# Patient Record
Sex: MALE | Race: WHITE | NOT HISPANIC OR LATINO
[De-identification: names, ages, dates, MRNs, and addresses within clinical notes are randomized per-mention and may not be internally consistent; named-entity substitution may affect disease eponyms.]

---

## 2019-12-18 ENCOUNTER — TRANSCRIPTION ENCOUNTER (OUTPATIENT)
Age: 29
End: 2019-12-18

## 2019-12-18 ENCOUNTER — INPATIENT (INPATIENT)
Facility: HOSPITAL | Age: 29
LOS: 0 days | Discharge: HOME | DRG: 343 | End: 2019-12-19
Attending: SURGERY | Admitting: SURGERY
Payer: COMMERCIAL

## 2019-12-18 VITALS
WEIGHT: 199.96 LBS | HEIGHT: 70 IN | HEART RATE: 69 BPM | TEMPERATURE: 98 F | RESPIRATION RATE: 20 BRPM | DIASTOLIC BLOOD PRESSURE: 84 MMHG | OXYGEN SATURATION: 99 % | SYSTOLIC BLOOD PRESSURE: 131 MMHG

## 2019-12-18 LAB
ALBUMIN SERPL ELPH-MCNC: 4.6 G/DL — SIGNIFICANT CHANGE UP (ref 3.3–5)
ALP SERPL-CCNC: 65 U/L — SIGNIFICANT CHANGE UP (ref 40–120)
ALT FLD-CCNC: 43 U/L — SIGNIFICANT CHANGE UP (ref 10–45)
ANION GAP SERPL CALC-SCNC: 12 MMOL/L — SIGNIFICANT CHANGE UP (ref 5–17)
APTT BLD: 31.1 SEC — SIGNIFICANT CHANGE UP (ref 27.5–36.3)
AST SERPL-CCNC: 23 U/L — SIGNIFICANT CHANGE UP (ref 10–40)
BASOPHILS # BLD AUTO: 0.03 K/UL — SIGNIFICANT CHANGE UP (ref 0–0.2)
BASOPHILS NFR BLD AUTO: 0.3 % — SIGNIFICANT CHANGE UP (ref 0–2)
BILIRUB SERPL-MCNC: 3 MG/DL — HIGH (ref 0.2–1.2)
BLD GP AB SCN SERPL QL: NEGATIVE — SIGNIFICANT CHANGE UP
BUN SERPL-MCNC: 14 MG/DL — SIGNIFICANT CHANGE UP (ref 7–23)
CALCIUM SERPL-MCNC: 9.9 MG/DL — SIGNIFICANT CHANGE UP (ref 8.4–10.5)
CHLORIDE SERPL-SCNC: 99 MMOL/L — SIGNIFICANT CHANGE UP (ref 96–108)
CO2 SERPL-SCNC: 28 MMOL/L — SIGNIFICANT CHANGE UP (ref 22–31)
CREAT SERPL-MCNC: 1.02 MG/DL — SIGNIFICANT CHANGE UP (ref 0.5–1.3)
EOSINOPHIL # BLD AUTO: 0.13 K/UL — SIGNIFICANT CHANGE UP (ref 0–0.5)
EOSINOPHIL NFR BLD AUTO: 1.4 % — SIGNIFICANT CHANGE UP (ref 0–6)
GLUCOSE SERPL-MCNC: 94 MG/DL — SIGNIFICANT CHANGE UP (ref 70–99)
HCT VFR BLD CALC: 44.5 % — SIGNIFICANT CHANGE UP (ref 39–50)
HGB BLD-MCNC: 15 G/DL — SIGNIFICANT CHANGE UP (ref 13–17)
IMM GRANULOCYTES NFR BLD AUTO: 0.3 % — SIGNIFICANT CHANGE UP (ref 0–1.5)
LYMPHOCYTES # BLD AUTO: 1.5 K/UL — SIGNIFICANT CHANGE UP (ref 1–3.3)
LYMPHOCYTES # BLD AUTO: 16 % — SIGNIFICANT CHANGE UP (ref 13–44)
MCHC RBC-ENTMCNC: 29 PG — SIGNIFICANT CHANGE UP (ref 27–34)
MCHC RBC-ENTMCNC: 33.7 GM/DL — SIGNIFICANT CHANGE UP (ref 32–36)
MCV RBC AUTO: 86.1 FL — SIGNIFICANT CHANGE UP (ref 80–100)
MONOCYTES # BLD AUTO: 0.56 K/UL — SIGNIFICANT CHANGE UP (ref 0–0.9)
MONOCYTES NFR BLD AUTO: 6 % — SIGNIFICANT CHANGE UP (ref 2–14)
NEUTROPHILS # BLD AUTO: 7.14 K/UL — SIGNIFICANT CHANGE UP (ref 1.8–7.4)
NEUTROPHILS NFR BLD AUTO: 76 % — SIGNIFICANT CHANGE UP (ref 43–77)
NRBC # BLD: 0 /100 WBCS — SIGNIFICANT CHANGE UP (ref 0–0)
PLATELET # BLD AUTO: 204 K/UL — SIGNIFICANT CHANGE UP (ref 150–400)
POTASSIUM SERPL-MCNC: 4.2 MMOL/L — SIGNIFICANT CHANGE UP (ref 3.5–5.3)
POTASSIUM SERPL-SCNC: 4.2 MMOL/L — SIGNIFICANT CHANGE UP (ref 3.5–5.3)
PROT SERPL-MCNC: 7.6 G/DL — SIGNIFICANT CHANGE UP (ref 6–8.3)
RBC # BLD: 5.17 M/UL — SIGNIFICANT CHANGE UP (ref 4.2–5.8)
RBC # FLD: 11.9 % — SIGNIFICANT CHANGE UP (ref 10.3–14.5)
RH IG SCN BLD-IMP: POSITIVE — SIGNIFICANT CHANGE UP
RH IG SCN BLD-IMP: POSITIVE — SIGNIFICANT CHANGE UP
SODIUM SERPL-SCNC: 139 MMOL/L — SIGNIFICANT CHANGE UP (ref 135–145)
WBC # BLD: 9.39 K/UL — SIGNIFICANT CHANGE UP (ref 3.8–10.5)
WBC # FLD AUTO: 9.39 K/UL — SIGNIFICANT CHANGE UP (ref 3.8–10.5)

## 2019-12-18 PROCEDURE — 99285 EMERGENCY DEPT VISIT HI MDM: CPT

## 2019-12-18 PROCEDURE — 93010 ELECTROCARDIOGRAM REPORT: CPT | Mod: NC

## 2019-12-18 PROCEDURE — 71046 X-RAY EXAM CHEST 2 VIEWS: CPT | Mod: 26

## 2019-12-18 RX ORDER — KETOROLAC TROMETHAMINE 30 MG/ML
15 SYRINGE (ML) INJECTION ONCE
Refills: 0 | Status: DISCONTINUED | OUTPATIENT
Start: 2019-12-18 | End: 2019-12-18

## 2019-12-18 RX ORDER — SODIUM CHLORIDE 9 MG/ML
1000 INJECTION, SOLUTION INTRAVENOUS ONCE
Refills: 0 | Status: COMPLETED | OUTPATIENT
Start: 2019-12-18 | End: 2019-12-18

## 2019-12-18 RX ORDER — PIPERACILLIN AND TAZOBACTAM 4; .5 G/20ML; G/20ML
3.38 INJECTION, POWDER, LYOPHILIZED, FOR SOLUTION INTRAVENOUS ONCE
Refills: 0 | Status: COMPLETED | OUTPATIENT
Start: 2019-12-18 | End: 2019-12-18

## 2019-12-18 RX ADMIN — SODIUM CHLORIDE 1000 MILLILITER(S): 9 INJECTION, SOLUTION INTRAVENOUS at 23:45

## 2019-12-18 RX ADMIN — SODIUM CHLORIDE 1000 MILLILITER(S): 9 INJECTION, SOLUTION INTRAVENOUS at 22:52

## 2019-12-18 RX ADMIN — Medication 15 MILLIGRAM(S): at 22:52

## 2019-12-18 NOTE — ED PROVIDER NOTE - CCCP TRG CHIEF CMPLNT
Please apply regular wound VAC to right foot plantar wound today  Discontinue veraflo as it was macerating the wound  Discussed with wound care team sent  for surgery/abdominal pain

## 2019-12-18 NOTE — ED PROVIDER NOTE - PROGRESS NOTE DETAILS
brought disc to charge to get to CT scan for upload Hyade Moyer MD consult to sx CT Disc retrieved from MR, brought to ED CT for upload and successfully uploaded. Pt feels ok at this time, no active complaints, surgery to review CT scan

## 2019-12-18 NOTE — ED PROVIDER NOTE - CARE PLAN
Principal Discharge DX:	Appendicitis, acute  Assessment and plan of treatment:	fluids, NPO, pain control, labs, imaging, admit to surgery

## 2019-12-18 NOTE — ED PROVIDER NOTE - OBJECTIVE STATEMENT
Pt is an otherwise healthy 29M presenting with abdominal pain x3 days. Started as generalized abdominal pain, dull in sensation, 3-4 in intensity, constant, worsened with movement, improved with rest, and progressively worsened until day of coming to the ER. Prior to coming to ER, he went to his PMD (Dr Karen Singh), and was sent for CTAP w/o IV contrast, showing mild acute appendicitis without abscess or complications. Today, he states that the pain is more localized to the RLQ, and was recommended by his PMD to come to the ER to treat his acute appendicitis.     He otherwise denies fevers, chills, nausea, vomiting, shortness of breath, cough, chest pain, lower extremity edema, diarrhea, or constipation. He does endorse decreased PO intake in the last 1-2 days. He did not take medicine for his abdominal pain

## 2019-12-18 NOTE — ED PROVIDER NOTE - ATTENDING CONTRIBUTION TO CARE
29 M w/ no pmh presents to the ED w/ concern for appendicitis seen on outpt ct scan. pt denies any chest pain no shortness of breath, +RLQ abdominal pain that is 5/10 in intensity.   I have reviewed the triage vital signs. Const: Well-nourished, Well-developed, Eyes: no conjunctival injection and no scleral icterus ENMT: Moist mucus membranes, CVS: +S1/S2, radial/DP pulse 2+ bilaterally  RESP: Unlabored respiratory effort, Clear to auscultation bilaterally GI: rlq abd pain, Nondistended soft abdomen, no CVA tenderness MSK: Extremities w/o deformity or ttp, Psych: Awake, Alert, & Orientedx3;  Appropriate mood and affect, cooperative  concern for acute appendicitis based on ct scan. will consult sx for further evaluation of symptoms.

## 2019-12-18 NOTE — ED PROVIDER NOTE - PHYSICAL EXAMINATION
T(C): 36.6 (12-18-19 @ 17:09), Max: 36.6 (12-18-19 @ 17:09)  HR: 69 (12-18-19 @ 17:09) (69 - 69)  BP: 131/84 (12-18-19 @ 17:09) (131/84 - 131/84)  RR: 20 (12-18-19 @ 17:09) (20 - 20)  SpO2: 99% (12-18-19 @ 17:09) (99% - 99%)    PHYSICAL EXAM:  General: Male laying in bed comfortably  HEENT: NCAT, no conjunctival pallor, no scleral icterus, dry mucous membranes  Pulmonary: Clear to auscultation bilaterally, no wheezing, rales, or ronchi  Cardiovascular: Regular rate and rhythm, normal S1/S2, no murmurs, rubs, or gallops  Abdominal Exam: Soft, tender to palpation in the RLQ to deep palpation, no rebound or guarding, non-tender in other quadrants, no HSM, no masses.  Extremities: No edema, pulses 2+ bilaterally in the upper and lower extremities, capillary refill < 2 sec.  Neuro: Grossly intact  Skin: Warm, dry, no visible jaundice, no rashes

## 2019-12-18 NOTE — ED PROVIDER NOTE - RAPID ASSESSMENT
**Pt seen in waiting room by  Chio Herr, documentation completed by Calin Garnica. Pt to be sent to main ED for further evaluation - all orders placed to be followed by MD in the main ED**    30 y/o M pt presents to ED for evaluation of appendicitis. Per pt, he was sent in for his appendicitis and has general discomfort in the lower abdomen, that he initially felt was in the middle but now feels in the LLQ. Pt relates that he's had worsening pain x3 days, and his last BM was this morning. Pt denies fever, chills, vomiting, diarrhea, and any other acute complaints. Pt is not on any medications or blood thinners. **Pt seen in waiting room by  Chio Herr, documentation completed by Calin Garnica. Pt to be sent to main ED for further evaluation - all orders placed to be followed by MD in the main ED**    30 y/o M pt presents to ED for evaluation of appendicitis. pain started 3 days ago, had outpatient scan today with positive appy.   states that he initially felt main in the mid abdomen but now feels in the LLQ. Pt relates that he's had worsening pain x3 days, and his last BM was this morning. Pt denies fever, chills, vomiting, diarrhea, and any other acute complaints. Pt is not on any medications or blood thinners.

## 2019-12-19 ENCOUNTER — TRANSCRIPTION ENCOUNTER (OUTPATIENT)
Age: 29
End: 2019-12-19

## 2019-12-19 VITALS — RESPIRATION RATE: 18 BRPM | OXYGEN SATURATION: 96 % | TEMPERATURE: 97 F

## 2019-12-19 DIAGNOSIS — K35.80 UNSPECIFIED ACUTE APPENDICITIS: ICD-10-CM

## 2019-12-19 PROCEDURE — C1889: CPT

## 2019-12-19 PROCEDURE — 96375 TX/PRO/DX INJ NEW DRUG ADDON: CPT

## 2019-12-19 PROCEDURE — 71046 X-RAY EXAM CHEST 2 VIEWS: CPT

## 2019-12-19 PROCEDURE — 86850 RBC ANTIBODY SCREEN: CPT

## 2019-12-19 PROCEDURE — 88304 TISSUE EXAM BY PATHOLOGIST: CPT

## 2019-12-19 PROCEDURE — 99285 EMERGENCY DEPT VISIT HI MDM: CPT | Mod: 25

## 2019-12-19 PROCEDURE — 85610 PROTHROMBIN TIME: CPT

## 2019-12-19 PROCEDURE — 88304 TISSUE EXAM BY PATHOLOGIST: CPT | Mod: 26

## 2019-12-19 PROCEDURE — 86901 BLOOD TYPING SEROLOGIC RH(D): CPT

## 2019-12-19 PROCEDURE — 85730 THROMBOPLASTIN TIME PARTIAL: CPT

## 2019-12-19 PROCEDURE — 93005 ELECTROCARDIOGRAM TRACING: CPT

## 2019-12-19 PROCEDURE — 96374 THER/PROPH/DIAG INJ IV PUSH: CPT

## 2019-12-19 PROCEDURE — 80053 COMPREHEN METABOLIC PANEL: CPT

## 2019-12-19 PROCEDURE — 86900 BLOOD TYPING SEROLOGIC ABO: CPT

## 2019-12-19 PROCEDURE — 85027 COMPLETE CBC AUTOMATED: CPT

## 2019-12-19 PROCEDURE — 96361 HYDRATE IV INFUSION ADD-ON: CPT

## 2019-12-19 RX ORDER — OXYCODONE HYDROCHLORIDE 5 MG/1
1 TABLET ORAL
Qty: 10 | Refills: 0
Start: 2019-12-19 | End: 2019-12-21

## 2019-12-19 RX ORDER — ACETAMINOPHEN 500 MG
1000 TABLET ORAL EVERY 6 HOURS
Refills: 0 | Status: DISCONTINUED | OUTPATIENT
Start: 2019-12-19 | End: 2019-12-19

## 2019-12-19 RX ORDER — SODIUM CHLORIDE 9 MG/ML
1000 INJECTION, SOLUTION INTRAVENOUS
Refills: 0 | Status: DISCONTINUED | OUTPATIENT
Start: 2019-12-19 | End: 2019-12-19

## 2019-12-19 RX ORDER — HYDROMORPHONE HYDROCHLORIDE 2 MG/ML
1 INJECTION INTRAMUSCULAR; INTRAVENOUS; SUBCUTANEOUS
Refills: 0 | Status: DISCONTINUED | OUTPATIENT
Start: 2019-12-19 | End: 2019-12-19

## 2019-12-19 RX ORDER — PIPERACILLIN AND TAZOBACTAM 4; .5 G/20ML; G/20ML
3.38 INJECTION, POWDER, LYOPHILIZED, FOR SOLUTION INTRAVENOUS ONCE
Refills: 0 | Status: DISCONTINUED | OUTPATIENT
Start: 2019-12-19 | End: 2019-12-19

## 2019-12-19 RX ORDER — HYDROMORPHONE HYDROCHLORIDE 2 MG/ML
0.5 INJECTION INTRAMUSCULAR; INTRAVENOUS; SUBCUTANEOUS
Refills: 0 | Status: DISCONTINUED | OUTPATIENT
Start: 2019-12-19 | End: 2019-12-19

## 2019-12-19 RX ORDER — PIPERACILLIN AND TAZOBACTAM 4; .5 G/20ML; G/20ML
3.38 INJECTION, POWDER, LYOPHILIZED, FOR SOLUTION INTRAVENOUS EVERY 8 HOURS
Refills: 0 | Status: DISCONTINUED | OUTPATIENT
Start: 2019-12-19 | End: 2019-12-19

## 2019-12-19 RX ORDER — ACETAMINOPHEN 500 MG
2 TABLET ORAL
Qty: 0 | Refills: 0 | DISCHARGE
Start: 2019-12-19

## 2019-12-19 RX ORDER — OXYCODONE HYDROCHLORIDE 5 MG/1
5 TABLET ORAL EVERY 4 HOURS
Refills: 0 | Status: DISCONTINUED | OUTPATIENT
Start: 2019-12-19 | End: 2019-12-19

## 2019-12-19 RX ORDER — ENOXAPARIN SODIUM 100 MG/ML
40 INJECTION SUBCUTANEOUS DAILY
Refills: 0 | Status: DISCONTINUED | OUTPATIENT
Start: 2019-12-19 | End: 2019-12-19

## 2019-12-19 RX ORDER — ONDANSETRON 8 MG/1
4 TABLET, FILM COATED ORAL ONCE
Refills: 0 | Status: DISCONTINUED | OUTPATIENT
Start: 2019-12-19 | End: 2019-12-19

## 2019-12-19 RX ADMIN — Medication 1000 MILLIGRAM(S): at 12:38

## 2019-12-19 RX ADMIN — PIPERACILLIN AND TAZOBACTAM 200 GRAM(S): 4; .5 INJECTION, POWDER, LYOPHILIZED, FOR SOLUTION INTRAVENOUS at 00:45

## 2019-12-19 RX ADMIN — SODIUM CHLORIDE 100 MILLILITER(S): 9 INJECTION, SOLUTION INTRAVENOUS at 08:35

## 2019-12-19 RX ADMIN — Medication 1000 MILLIGRAM(S): at 12:39

## 2019-12-19 NOTE — H&P ADULT - ASSESSMENT
30yo M with no MHx or SHx presented 12/18 with 3 days of constant, worsening periumbilical pain with development of RLQ pain and decreased appetite. Outpatient CT read by [facility] radiology showed acute uncomplicated appendicitis on prelim read.    Plan  - Admit to Dr. PARMINDER Jean  - NPO with IVF  - Zosyn antibiotics  - Booked and consented for appendectomy  - DVT ppx: Lovenox  - Discussed with Dr. PARMINDER Jean 30yo M with no MHx or SHx presented 12/18 with 3 days of constant, worsening periumbilical pain with development of RLQ pain and decreased appetite. Outpatient CT read by [facility] radiology showed acute uncomplicated appendicitis on prelim read.    Plan  - Admit to Dr. PARMINDER Jean  - NPO with IVF  - Zosyn antibiotics  - Booked and consented for laparoscopic appendectomy  - DVT ppx: Lovenox  - Discussed with Dr. PARMINDER Jean

## 2019-12-19 NOTE — DISCHARGE NOTE PROVIDER - HOSPITAL COURSE
28yo M with no MHx or SHx presented 12/18 with 3 days of constant, worsening periumbilical pain with development of RLQ pain and decreased appetite. Patient stated that he first noticed periumbilical discomfort 3 days prior to presentation and worsening of pain caused him to present to his PCP in the morning of 12/18. His PCP sent him for an outpatient CT scan that reportedly showed acute appendicitis. He was subsequently sent to the ED where he had WBC 9.4, endorsed an episode of chills, and read of outpatient CT by [facility] radiology showed acute uncomplicated appendicitis on prelim read. He received 1L fluid bolus and Zosyn in the ED. Patient denied N/V, fever, CP, SOB, diarrhea, constipation, or dysuria.        Patient was admitted to RED surgery, made NPO/IVF and given IV antibiotics.  The patient was taken to the OR on and underwent a laparoscopic appendectomy.  The patient tolerated the procedure well without complications, was extubated, and transferred to the PACU in stable condition. In the PACU, the patients' pain was controlled, vitals stable, tolerating PO, and voiding appropriately.  The patient was transferred to the surgical floor in stable condition.         On day of discharge, the patient was tolerating diet, ambulating well and pain controlled. Patient will be discharged home with outpatient follow up with Dr. Jean within 1-2 weeks.

## 2019-12-19 NOTE — DISCHARGE NOTE NURSING/CASE MANAGEMENT/SOCIAL WORK - PATIENT PORTAL LINK FT
You can access the FollowMyHealth Patient Portal offered by [facility] by registering at the following website: [email or web address]. By joining [facility]’s FollowMyHealth portal, you will also be able to view your health information using other applications (apps) compatible with our system.

## 2019-12-19 NOTE — H&P ADULT - NSHPPHYSICALEXAM_GEN_ALL_CORE
Physical Exam:  Gen: NAD  LS: nml respiratory effort  Card: pulse regularly present  GI: abd soft, periumbilical and RLQ tender to palpation  Ext: warm

## 2019-12-19 NOTE — DISCHARGE NOTE PROVIDER - NSDCCPCAREPLAN_GEN_ALL_CORE_FT
PRINCIPAL DISCHARGE DIAGNOSIS  Diagnosis: Appendicitis, acute  Assessment and Plan of Treatment: WOUND CARE: You may shower. Pat Dry abdomen. Leave the white steri strips in place, they will fall off on their own in approximately 5-7 days.   BATHING: Please do not submerge wound underwater. You may shower and/or sponge bathe.  ACTIVITY: No heavy lifting anything more than 10-15lbs or straining. Otherwise, you may return to your usual level of physical activity. If you are taking narcotic pain medication (such as Percocet), do NOT drive a car, operate machinery or make important decisions.  DIET: Low fiber diet  NOTIFY YOUR SURGEON IF: You have any bleeding that does not stop, any pus draining from your wound, any fever (over 100.4 F) or chills, persistent nausea/vomiting with inability to tolerate food or liquids, persistent diarrhea, or if your pain is not controlled on your discharge pain medications.  FOLLOW-UP:  1. Please call to make a follow-up appointment within one week of discharge   2. Please follow up with your primary care physician in one week regarding your hospitalization.

## 2019-12-19 NOTE — DISCHARGE NOTE PROVIDER - NSDCMRMEDTOKEN_GEN_ALL_CORE_FT
acetaminophen 500 mg oral tablet: 2 tab(s) orally every 6 hours  oxyCODONE 5 mg oral tablet: 1 tab(s) orally every 6 hours, As Needed -Severe Pain (7 - 10) MDD:4 tabs

## 2019-12-19 NOTE — H&P ADULT - NSHPLABSRESULTS_GEN_ALL_CORE
15.0                  Neurophils% (auto):   76.0   (12-18 @ 18:56):    9.39 )-----------(204          Lymphocytes% (auto):  16.0                                          44.5                   Eosinphils% (auto):   1.4      Manual%: Neutrophils x    ; Lymphocytes x    ; Eosinophils x    ; Bands%: x    ; Blasts x          12-18    139  |  99  |  14  ----------------------------<  94  4.2   |  28  |  1.02    Ca    9.9      18 Dec 2019 18:56    TPro  7.6  /  Alb  4.6  /  TBili  3.0<H>  /  DBili  x   /  AST  23  /  ALT  43  /  AlkPhos  65  12-18    ( 12-18 @ 18:56 )   PT: 13.1 sec;   INR: 1.13 ratio  aPTT: 31.1 sec        RECENT CULTURES:          Imaging:  ******PRELIMINARY REPORT******    ******PRELIMINARY REPORT******          EXAM:  XR CHEST PA LAT 2V                          PROCEDURE DATE:  12/18/2019      ******PRELIMINARY REPORT******    ******PRELIMINARY REPORT******          INTERPRETATION:  no emergent findings    ******PRELIMINARY REPORT******    ******PRELIMINARY REPORT******

## 2019-12-19 NOTE — DISCHARGE NOTE PROVIDER - CARE PROVIDER_API CALL
Juan Diego Jean)  Surgery  [address], [address]  [place] [address]  Phone: [phone]  Fax: [phone]  Follow Up Time: 2 weeks

## 2019-12-19 NOTE — H&P ADULT - HISTORY OF PRESENT ILLNESS
30yo M with no MHx or SHx presented 12/18 with 3 days of constant, worsening periumbilical pain with development of RLQ pain and decreased appetite. Patient stated that he first noticed periumbilical discomfort 3 days prior to presentation and worsening of pain caused him to present to his PCP in the morning of 12/18. His PCP sent him for an outpatient CT scan that reportedly showed acute appendicitis. He was subsequently sent to the ED where he had WBC 9.4, endorsed an episode of chills, and read of outpatient CT by [facility] radiology showed acute uncomplicated appendicitis on prelim read. He received 1L fluid bolus and Zosyn in the ED. Patient denied N/V, fever, CP, SOB, diarrhea, constipation, or dysuria.

## 2019-12-26 LAB — SURGICAL PATHOLOGY STUDY: SIGNIFICANT CHANGE UP

## 2021-08-18 NOTE — ED ADULT NURSE NOTE - BREATHING, MLM
Spontaneous, unlabored and symmetrical Suturegard Body: The suture ends were repeatedly re-tightened and re-clamped to achieve the desired tissue expansion.

## 2022-05-24 DIAGNOSIS — Z00.00 ENCOUNTER FOR GENERAL ADULT MEDICAL EXAMINATION W/OUT ABNORMAL FINDINGS: ICD-10-CM

## 2022-06-20 DIAGNOSIS — Z31.440 ENCOUNTER OF MALE FOR TESTING FOR GENETIC DISEASE CARRIER STATUS FOR PROCREATIVE MANAGEMENT: ICD-10-CM

## 2022-07-18 ENCOUNTER — OFFICE (OUTPATIENT)
Dept: URBAN - METROPOLITAN AREA CLINIC 93 | Facility: CLINIC | Age: 32
Setting detail: OPHTHALMOLOGY
End: 2022-07-18
Payer: COMMERCIAL

## 2022-07-18 VITALS — WEIGHT: 193 LBS | HEIGHT: 70 IN | BODY MASS INDEX: 27.63 KG/M2

## 2022-07-18 DIAGNOSIS — T15.01XA: ICD-10-CM

## 2022-07-18 PROBLEM — T15.01X CORNEAL FOREIGN BODY; RIGHT EYE: Status: ACTIVE | Noted: 2022-07-18

## 2022-07-18 PROCEDURE — 92002 INTRM OPH EXAM NEW PATIENT: CPT | Performed by: OPHTHALMOLOGY

## 2022-07-18 PROCEDURE — 65210 REMOVE FOREIGN BODY FROM EYE: CPT | Performed by: OPHTHALMOLOGY

## 2022-07-18 ASSESSMENT — CORNEAL TRAUMA - FOREIGN BODY: OD_FOREIGNBODY: NON-PENETRATING NON METALLIC PRESENT

## 2022-07-18 ASSESSMENT — VISUAL ACUITY
OD_BCVA: 20/20-1
OS_BCVA: 20/30-2

## 2022-09-28 NOTE — PATIENT PROFILE ADULT - NSPROPTRIGHTSUPPORTNAME_GEN_A_NUR
Diabetic eye exam GAP report-I spoke to pt regarding her overdue Diabetic eye exam and she stated she will call back schedule.       
Bre Vogt

## 2023-01-24 ENCOUNTER — APPOINTMENT (OUTPATIENT)
Dept: HUMAN REPRODUCTION | Facility: CLINIC | Age: 33
End: 2023-01-24

## 2025-01-28 NOTE — PATIENT PROFILE ADULT - NSPROGENPREVTRANSF_GEN_A_NUR
Recent dehydration and YOLI.  Most recent creatinine 2.1, baseline creatinine 1.6  - Recheck BMP  
Recent hypotension and dizziness after recent weight loss.  All blood pressure medications are currently on hold after recent hospitalization.  His blood pressure has been stable over the last several days, averaging 120s-130s/70s-80s.    - No changes to current treatment plan  - Continue to monitor blood pressure closely.  May need to slowly restart BP medications.  - Will likely need to restart doxazosin first due to urinary symptoms  
no